# Patient Record
Sex: MALE | Race: WHITE | NOT HISPANIC OR LATINO
[De-identification: names, ages, dates, MRNs, and addresses within clinical notes are randomized per-mention and may not be internally consistent; named-entity substitution may affect disease eponyms.]

---
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
## 2019-01-15 ENCOUNTER — APPOINTMENT (OUTPATIENT)
Dept: SPORTS MEDICINE | Facility: CLINIC | Age: 19
End: 2019-01-15
Payer: COMMERCIAL

## 2019-01-15 DIAGNOSIS — M75.51 BURSITIS OF RIGHT SHOULDER: ICD-10-CM

## 2019-01-15 PROCEDURE — 76881 US COMPL JOINT R-T W/IMG: CPT

## 2019-01-15 PROCEDURE — 99214 OFFICE O/P EST MOD 30 MIN: CPT | Mod: 25

## 2019-01-15 NOTE — HISTORY OF PRESENT ILLNESS
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 He exacerbated his right shoulder pain after playing on September 2018 in tennis matches. Patient reports that pain is when he externally rotates his arm. He also has his right fourth and fifth digits with tingling, but no neck pain and no injury. She had a cervical spine MRI on 6/13/18 when he did have these symptoms as well that was negative for disc herniation or acute pathology, only had straightening of cervical lordosis. MRI of right shoulder on 3/9-18 showed a mild infraspinatus tendinopathy\par     Attending note. This is a new problem for an existing patient. Patient is a 18-year-old college  with chronic right shoulder pain. Patient had MRI in February of 2018 which showed infraspinatus tendinopathy. Patient attempted several months of rest, however pain returned when he resumed playing tennis. Patient is a right-hand dominant player with a two-handed backhand. The patient has pain with serve as well as backhand it probably. Patient does not have pain with his two-handed backhand. Patient has been getting medical massage without improvement. He has not done physical therapy in several months.

## 2019-01-15 NOTE — DISCUSSION/SUMMARY
[de-identified] : Impression:\par 1) right shoulder pain\par 2) infraspinatus tendons split tear\par 3) SASD bursitis\par \par Plan:\par Patient has been having recurrent episodes of right shoulder pain that worsens with external rotation against resistance and with overhead movements, in the setting of an MRI that shows a mild and from spinatus tear from3/9/18, and confirmed with ultrasound findings and physical exam findings. Not improving with physical therapy. We had a discussion regarding regenerative injections including prolotherapy and PRP. Patient and mother would like to schedule appointment for PRP. They were made aware of the risks and benefits and alternatives to the procedure. They were also educated about not using NSAIDs or steroid at least 4-5 days prior to the procedure. \par       Attending note. Impression: #1 chronic right shoulder pain, #2 tendinopathy of the right infraspinatus. Discussed prolotherapy versus PRP. We'll perform PRP under right infraspinatus at next visit. Patient advised not to use NSAIDs before and after her injection. He understands he may require a second PRP injection if only limited response. He should refrain from tennis for 2 weeks and strokes that exacerbate his pain. He will also be provided with a prescription for physical therapy which is a significant component of the treatment for his chronic pain.

## 2019-01-15 NOTE — PHYSICAL EXAM
[FreeTextEntry2] : GEN: no acute distress, AAOx3\par EYES: no conjunctival injection, EOMI\par ENT: no stridor, neck supple with full ROM\par CV: normal capillary refill, normal heart rate, normal peripheral perfusion\par RESP: non-labored breathing, no respiratory distress\par SKIN: warm and dry, no skin breaks\par NEURO: CN grossly intact, no focal sensory or motor deficits\par  \par MSK Shoulder R :\par \par Inspection without erythema, deformity or swelling of the shoulder.\par Palpation without warmth, and without TTP to the C-spine midline, AC joint, deltoid, humerus and elbow.\par Full active and passive ROM of the shoulder including flexion, extension, abduction, adduction, internal and external rotation. However + pain with external rotation with arm abducted at 90 degrees and with adducted arm.\par Normal radial pulses bilaterally. Brisk capillary refill. Soft compartments of the upper extremity.\par Normal motor examination with 5/5 strength and normal sensory examination of median, radial, nerve distribution. Normal sensation of the axillary nerve distribution.\par \par Special tests: \par Neer: negative\par Hawkin: negative\par Eric: + reproduction of pain to the R\par Drop Arm Test: negative\par Speed Test: negative\par Clunk and Crank Test: negative\par Spurling's: negative\par ULTT: positive for Ulnar nerve\par \par Examination of the unaffected shoulder was unremarkable. \par      Attending note. Agree with the above. Patient has tenderness over the infraspinatus. Patient also has pain with resisted external rotation the right shoulder. Skin is normal. Sensation is normal. [de-identified] : MSK US of R Shoulder: The infraspinatus tendon has a hypoechoic defects it is consistent with a split tear. The supraspinatus tendon is thickened and heterogeneous but no obvious tear was noted. There was thickening of the SASD bursa.   Attn - directly supervised and reviewed - agree.

## 2019-01-17 ENCOUNTER — APPOINTMENT (OUTPATIENT)
Dept: ORTHOPEDIC SURGERY | Facility: CLINIC | Age: 19
End: 2019-01-17

## 2019-01-22 ENCOUNTER — APPOINTMENT (OUTPATIENT)
Dept: SPORTS MEDICINE | Facility: CLINIC | Age: 19
End: 2019-01-22
Payer: COMMERCIAL

## 2019-01-22 PROCEDURE — 0232T NJX PLATELET PLASMA: CPT

## 2019-01-22 NOTE — DISCUSSION/SUMMARY
[de-identified] : Attending note. Impression: #1 chronic right shoulder pain, #2 tendinopathy of the right infraspinatus tendon with tear. Patient received PRP today. He will not use anti-inflammatories for the next 72 hours. Patient should not play tennis for the next 2 weeks. Prescription for physical therapy was provided. Return to office in 6 weeks' time for reevaluation.

## 2019-01-22 NOTE — PHYSICAL EXAM
[de-identified] : GEN: no acute distress, AAOx3\par EYES: no conjunctival injection, EOMI\par ENT: no stridor, neck supple with full ROM\par CV: normal capillary refill, normal heart rate, normal peripheral perfusion\par RESP: non-labored breathing, no respiratory distress\par SKIN: warm and dry, no skin breaks\par NEURO: CN grossly intact, no focal sensory or motor deficits\par  \par MSK Shoulder R :\par \par Inspection without erythema, deformity or swelling of the shoulder.\par Palpation without warmth, and without TTP to the C-spine midline, AC joint, deltoid, humerus and elbow.\par Full active and passive ROM of the shoulder including flexion, extension, abduction, adduction, internal and external rotation.\par Normal radial pulses bilaterally. Brisk capillary refill. Soft compartments of the upper extremity.\par Normal motor examination with 5/5 strength and normal sensory examination of median, radial, and ulnar nerve distribution. Normal sensation of the axillary nerve distribution.\par \par Special tests: \par Posterior shoulder pain is reproducible with external rotation against resistance. \par Neer: negative\par Hawkin: negative\par Eric: negative\par Drop Arm Test: negative\par Speed Test: negative\par Clunk and Crank Test: negative\par Spurling's: negative\par \par Examination of the unaffected shoulder was unremarkable. \par \par Attending note. Patient is alert and in acute distress. Patient has some tenderness over the right infraspinatus muscles and tendons. Skin is normal and without signs of cellulitis or erythema.

## 2019-01-22 NOTE — HISTORY OF PRESENT ILLNESS
[de-identified] : Jose Villanueva MD: follow-up visit for R posterior shoulder pain that has flared up after he had tennis practice 5 days ago, after one hour of activity. Pain is back as baseline.  \par \par Attending note. This will visit for this 18-year-old college  with chronic right shoulder pain and tendinopathy of the infraspinatus on MRI. Patient is here for PRP. Patient is not taking NSAIDs in the last 72 hours

## 2019-01-22 NOTE — PROCEDURE
[de-identified] : Attending note. Using ultrasound guidance and aseptic technique, 4 cc of PRP was injected into the musculotendinous junction and tendon of the right supraspinatus. Procedure was tolerated well. Patient experienced some pain.

## 2019-02-05 ENCOUNTER — APPOINTMENT (OUTPATIENT)
Dept: SPORTS MEDICINE | Facility: CLINIC | Age: 19
End: 2019-02-05
Payer: COMMERCIAL

## 2019-02-05 DIAGNOSIS — M25.511 PAIN IN RIGHT SHOULDER: ICD-10-CM

## 2019-02-05 PROCEDURE — 99213 OFFICE O/P EST LOW 20 MIN: CPT

## 2019-02-05 NOTE — PHYSICAL EXAM
[de-identified] : Attending note. Patient is alert and in no acute distress. Examination of the right shoulder reveals full range of motion with some soreness on forward flexion and abduction.

## 2019-02-05 NOTE — DISCUSSION/SUMMARY
[de-identified] : Attending note. Impression: #1 chronic right shoulder pain, #2 tendinopathy of the right infraspinatus. Patient is now 2 weeks status post PRP and reports resolution of previous pain, and only complains of some stiffness and soreness in the right shoulder. Patient was provided with a prescription for physical therapy today. He should refrain from tennis strokes which exacerbate any pain and should not play in simulated games or matches for 2 more weeks. Patient will return to the office on March 5 for reevaluation.

## 2019-03-05 ENCOUNTER — APPOINTMENT (OUTPATIENT)
Dept: SPORTS MEDICINE | Facility: CLINIC | Age: 19
End: 2019-03-05
Payer: COMMERCIAL

## 2019-03-05 DIAGNOSIS — S46.811A STRAIN OF OTHER MUSCLES, FASCIA AND TENDONS AT SHOULDER AND UPPER ARM LEVEL, RIGHT ARM, INITIAL ENCOUNTER: ICD-10-CM

## 2019-03-05 PROCEDURE — 99213 OFFICE O/P EST LOW 20 MIN: CPT

## 2019-03-05 NOTE — DISCUSSION/SUMMARY
[de-identified] : Impression:\par 1) right shoulder pain\par 2) right infraspinatus tendinopathy with split tear: Patient is now 6 weeks after PRP injection, and has had resolution of symptoms with 20 minute practices for the last one week. Patient is still limited in duration and intensity of practices as guided by his . \par \par Plan:\par -Continue with physical therapy and home exercise program\par -At this time patient is able to progress to a longer duration of practices and increase his intensity to 75%\par -Patient should refrain from tennis strokes that exacerbate symptoms but should not play through pain\par -Recommended the patient to refrain from playing in the tournament in [place] in 2 weeks because he is likely deconditioned and has risk of exacerbating his previous injury\par

## 2019-03-05 NOTE — HISTORY OF PRESENT ILLNESS
[de-identified] : Jose Villanueva MD: Follow for 18-year-old college  after PRP to right infraspinatus on January 22. Patient has been participating in limited practices at 20 minutes and not hitting with full velocity or serving overhand. Patient had a flare of pain 2 weeks ago after practice but since then has had no pain during practice or residual pain afterwards. No fevers, chills, numbness, weakness. He has a tournament in [place] in 2 weeks but is aware that he could be limited in participation.

## 2019-03-05 NOTE — PHYSICAL EXAM
[de-identified] : GEN: no acute distress, AAOx3\par EYES: no conjunctival injection, EOMI\par ENT: no stridor, neck supple with full ROM\par CV: normal capillary refill, normal heart rate, normal peripheral perfusion\par RESP: non-labored breathing, no respiratory distress\par SKIN: warm and dry, no skin breaks\par NEURO: CN grossly intact, no focal sensory or motor deficits\par  \par MSK Shoulder R :\par \par Inspection without erythema, deformity or swelling of the shoulder.\par Palpation without warmth, and without TTP to the C-spine midline, AC joint, deltoid, humerus and elbow.\par Full active and passive ROM of the shoulder including flexion, extension, abduction, adduction, internal and external rotation.\par Normal radial pulses bilaterally. Brisk capillary refill. Soft compartments of the upper extremity.\par Normal motor examination with 5/5 strength and normal sensory examination of median, radial, and ulnar nerve distribution. Normal sensation of the axillary nerve distribution.\par \par Special tests: \par Neer: negative\par Hawkin: + mild reproduction of pain\par Eric: negative\par Drop Arm Test: negative\par Speed Test: negative\par Clunk and Crank Test: negative\par Spurling's: negative\par No pain with resisted IR or ER of the shoulder w/ arm adducted. \par + mild dyskinesis of R scapula\par \par Examination of the unaffected shoulder was unremarkable.  [de-identified] : MSK US performed in office.  There was no evidence of significant mid-substance tearing at the infraspinatus/teres minor insertion.

## 2019-06-18 ENCOUNTER — APPOINTMENT (OUTPATIENT)
Dept: SPORTS MEDICINE | Facility: CLINIC | Age: 19
End: 2019-06-18
Payer: COMMERCIAL

## 2019-06-18 PROCEDURE — 72040 X-RAY EXAM NECK SPINE 2-3 VW: CPT

## 2019-06-18 PROCEDURE — 99214 OFFICE O/P EST MOD 30 MIN: CPT | Mod: 25

## 2019-06-18 NOTE — REVIEW OF SYSTEMS
[Joint Pain] : joint pain [Joint Stiffness] : no joint stiffness [Negative] : Endocrine [de-identified] : + tingling

## 2019-06-18 NOTE — DISCUSSION/SUMMARY
[de-identified] : Impression:\par 1. paresthesias in the ulnar distribution: The patient's symptoms may be due to brachial plexopathy as patient had progression of symptoms with traction of the upper trunk of the brachial plexus, and ULTT4 test, however he may also have a component of cervical radiculopathy secondary to a pinched nerve root. Symptoms are likely mostly neuropathic at this time rather than due to shoulder pathology, though this may be concomitant. \par 2. cervical radiculopathy\par 3. brachial plexopathy\par 4. posterior shoulder pain\par \par Plan:\par -Plan to do physical therapy for cervical spine\par -Consider MRI of the C-spine versus brachial plexus if symptoms do not improve\par -Consider corticosteroids orally this patient has persistence of pain\par -Activity modification, NSAIDs as needed\par -Given strict precautions including weakness of the affected extremity and complete numbness, for which he should visit the ED\par -RTC in 3-4 weeks

## 2019-06-18 NOTE — PHYSICAL EXAM
[de-identified] : GEN: no acute distress, AAOx3\par EYES: no conjunctival injection, EOMI\par ENT: no stridor, neck supple with full ROM\par CV: normal capillary refill, normal heart rate, normal peripheral perfusion\par RESP: non-labored breathing, no respiratory distress\par SKIN: warm and dry, no skin breaks\par NEURO: CN grossly intact, no focal sensory or motor deficits\par \par MSK Shoulder R:\par \par Inspection without erythema, deformity or swelling of the shoulder.\par Palpation without warmth, and without TTP to the C-spine midline, AC joint, deltoid, humerus and elbow.\par Full active and passive ROM of the shoulder including flexion, extension, abduction, adduction, internal and external rotation.\par Normal radial pulses bilaterally. Brisk capillary refill. Soft compartments of the upper extremity.\par Normal motor examination with 5/5 strength and normal sensory examination of median, radial, and ulnar nerve distribution. Normal sensation of the axillary nerve distribution.\par \par Special tests: \par Neer: negative\par Hawkin: negative\par Eric: negative\par Drop Arm Test: negative\par Speed Test: negative\par Clunk and Crank Test: negative\par Spurling's: negative\par ULTT4: positive for ulnar distribution paresthesia\par Mild pain w/ resisted internal rotation, but no pain w/ resisted external rotation (previously this reproduced symptoms)\par \par Examination of the unaffected shoulder was unremarkable.

## 2019-06-18 NOTE — HISTORY OF PRESENT ILLNESS
[de-identified] : Jose Villanueva MD: This is a followup visit for this 19-year-old male , who had received therapy injection PRP to the right infraspinatus tendon. Patient had near resolution of symptoms and was able to complete his season. However after his season ended, he played in several tournaments and has since had right-sided neck pain that radiates to his right fourth and fifth digit with tingling and numbness. He denies any acute injury, falls, MVC. He denies any weakness associated with this, or dropping of items. he has had similar sensation in the past and was seen by a spine doctor at that time. He also has right posterior shoulder pain that is mild in nature and difficult to delineate between this and the radiating pain.

## 2019-07-09 ENCOUNTER — APPOINTMENT (OUTPATIENT)
Dept: SPORTS MEDICINE | Facility: CLINIC | Age: 19
End: 2019-07-09
Payer: COMMERCIAL

## 2019-07-09 DIAGNOSIS — M79.601 PAIN IN RIGHT ARM: ICD-10-CM

## 2019-07-09 PROCEDURE — 99213 OFFICE O/P EST LOW 20 MIN: CPT

## 2019-07-09 NOTE — PHYSICAL EXAM
[de-identified] : Attending note. Patient is alert and in no acute distress. There is no cervical tenderness. Patient has full range of motion of his neck. Trapezius is nontender. Patient is external rotation to approximately 90° and internal rotation to approximately 75-80°. There is no shoulder pain with internal or external rotation. Patient experiences pain in the medial right bicep when right shoulder abductor and external rotation and moved to internal rotation. Biceps strength is 5/5 equal and symmetrical. Triceps strength is 5/5 equal and symmetrical. Median, radial and ulnar nerves are intact both motor and sensory function. Distal pulses are intact. Sensation is intact. Skin is normal. He has no pain with resisted internal rotation or external rotation of the right shoulder.

## 2019-07-09 NOTE — DISCUSSION/SUMMARY
[de-identified] : Attending note. Impression: #1 cervical radiculopathy - resolve, #2 right medial arm pain. Pain possibly secondary to GIRD versus adhesions. Patient was given a prescription for physical therapy for GIRD.  He will return to the office before he returns to college on August 26. 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

## 2019-07-09 NOTE — REVIEW OF SYSTEMS
[Arthralgia] : no arthralgia [Joint Stiffness] : joint stiffness [Joint Pain] : no joint pain [Joint Swelling] : no joint swelling [FreeTextEntry9] : medial right upper arm pain

## 2019-07-09 NOTE — HISTORY OF PRESENT ILLNESS
[de-identified] : Attending note. 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 Patient was previously complaining of pain in the right trapezius with paresthesia radiating to the right fourth and fifth digits. Patient was seen by a chiropractor and had manipulation which seems to have resolved pain. Patient is currently complaining of q. daily lancing pain in the medial right bicep which only occurs when right shoulder is abducted and ended movement from external rotation to internal rotation. He denies any muscular weakness. Denies any neck pain.

## 2019-11-14 ENCOUNTER — APPOINTMENT (OUTPATIENT)
Dept: UROLOGY | Facility: CLINIC | Age: 19
End: 2019-11-14
Payer: MEDICAID

## 2019-11-14 PROCEDURE — 99213 OFFICE O/P EST LOW 20 MIN: CPT

## 2019-11-14 RX ORDER — SILDENAFIL 20 MG/1
20 TABLET ORAL
Qty: 30 | Refills: 11 | Status: ACTIVE | COMMUNITY
Start: 2019-11-14 | End: 1900-01-01

## 2019-12-18 NOTE — ED ADULT TRIAGE NOTE - NS ED NOTE AC HIGH RISK COUNTRIES
No Normal rate, regular rhythm.  Heart sounds S1, S2.  No murmurs, rubs or gallops. no chest wall tenderness

## 2020-01-23 ENCOUNTER — APPOINTMENT (OUTPATIENT)
Dept: UROLOGY | Facility: CLINIC | Age: 20
End: 2020-01-23

## 2020-12-08 ENCOUNTER — APPOINTMENT (OUTPATIENT)
Dept: UROLOGY | Facility: CLINIC | Age: 20
End: 2020-12-08
Payer: MEDICAID

## 2020-12-08 ENCOUNTER — TRANSCRIPTION ENCOUNTER (OUTPATIENT)
Age: 20
End: 2020-12-08

## 2020-12-08 ENCOUNTER — OUTPATIENT (OUTPATIENT)
Dept: OUTPATIENT SERVICES | Facility: HOSPITAL | Age: 20
LOS: 1 days | End: 2020-12-08
Payer: MEDICAID

## 2020-12-08 VITALS
HEART RATE: 72 BPM | BODY MASS INDEX: 27.09 KG/M2 | HEIGHT: 72 IN | SYSTOLIC BLOOD PRESSURE: 141 MMHG | DIASTOLIC BLOOD PRESSURE: 80 MMHG | WEIGHT: 200 LBS | TEMPERATURE: 97.8 F | RESPIRATION RATE: 16 BRPM

## 2020-12-08 DIAGNOSIS — R35.0 FREQUENCY OF MICTURITION: ICD-10-CM

## 2020-12-08 PROCEDURE — 99215 OFFICE O/P EST HI 40 MIN: CPT | Mod: 25

## 2020-12-08 PROCEDURE — 93975 VASCULAR STUDY: CPT | Mod: 26

## 2020-12-08 PROCEDURE — 76870 US EXAM SCROTUM: CPT

## 2020-12-08 PROCEDURE — 76870 US EXAM SCROTUM: CPT | Mod: 26

## 2020-12-08 PROCEDURE — 99072 ADDL SUPL MATRL&STAF TM PHE: CPT

## 2020-12-08 PROCEDURE — 93975 VASCULAR STUDY: CPT

## 2020-12-08 RX ORDER — TADALAFIL 10 MG/1
10 TABLET, FILM COATED ORAL
Qty: 12 | Refills: 2 | Status: ACTIVE | COMMUNITY
Start: 2020-12-08 | End: 1900-01-01

## 2020-12-08 RX ORDER — PAPAVERINE HYDROCHLORIDE 30 MG/ML
30 INJECTION, SOLUTION INTRAVENOUS
Qty: 2 | Refills: 0 | Status: ACTIVE | COMMUNITY
Start: 2020-12-08 | End: 1900-01-01

## 2020-12-08 NOTE — ASSESSMENT
[FreeTextEntry1] : This pleasant 20-year-old gentleman presents for evaluation of his sexual dysfunction.  I have requested several blood studies.  Urine dip and microscopic analysis was requested.  I will make further recommendations when the results return. I have suggested a diagnostic injection and duplex ultrasound to evaluate his penile vasculature.  I have also suggested a scrotal ultrasound for his intermittent testicular discomfort.  We discussed his evaluation today and possible options for therapy depending upon the results of his testing.  I will be better able to make more specific recommendations after additional test results return. \par 1. Review blood tests on follow up\par 2. Penile duplex study on follow up\par \par Consultation: 40 minutes:  20 minutes reviewing his history and performing a physical examination.  20 minutes reviewing the ultrasound, writing for prescription medications and blood studies ,discussing treatment options and writing his note. There was also additional time in preparation for today's visit.\par

## 2020-12-08 NOTE — HISTORY OF PRESENT ILLNESS
[FreeTextEntry1] : Patient presents with a chief complaint of erectile dysfunction and right testicular pain which is intermittent. He states that this has been present for the past several years. I reviewed the questionnaire he completed in detail.  He states that his erections presently are often less than 5 out of 10 in both tumescence and rigidity.  He has difficulty maintaining an erection. He describes a normal libido.  He has several sexual partners.  His sexual dysfunction occurs with both sexual relations and masturbation.  His erections are minimally improved with PDE5 inhibitors.  He is a senior in college.\par \par The patient denies fevers, chills, nausea and/or vomiting and no unexplained weight loss.  His past medical history is non-contributory.  In his present occupation he has no known toxin exposure. He does not smoke and drinks socially.  He has no known drug allergies. His review of systems and past medical history demonstrates no significant urologic issues (see patient completed questionnaire). His family history demonstrates no significant urologic issues.

## 2020-12-09 LAB
25(OH)D3 SERPL-MCNC: 28.1 NG/ML
ALBUMIN SERPL ELPH-MCNC: 5 G/DL
ALP BLD-CCNC: 93 U/L
ALT SERPL-CCNC: 15 U/L
ANION GAP SERPL CALC-SCNC: 11 MMOL/L
APPEARANCE: CLEAR
AST SERPL-CCNC: 13 U/L
BASOPHILS # BLD AUTO: 0.05 K/UL
BASOPHILS NFR BLD AUTO: 0.7 %
BILIRUB SERPL-MCNC: 0.8 MG/DL
BILIRUBIN URINE: NEGATIVE
BLOOD URINE: NEGATIVE
BUN SERPL-MCNC: 12 MG/DL
CALCIUM SERPL-MCNC: 10 MG/DL
CHLORIDE SERPL-SCNC: 103 MMOL/L
CHOLEST SERPL-MCNC: 166 MG/DL
CO2 SERPL-SCNC: 28 MMOL/L
COLOR: COLORLESS
CREAT SERPL-MCNC: 1.03 MG/DL
EOSINOPHIL # BLD AUTO: 0.01 K/UL
EOSINOPHIL NFR BLD AUTO: 0.1 %
ESTIMATED AVERAGE GLUCOSE: 100 MG/DL
ESTRADIOL SERPL-MCNC: 37 PG/ML
FSH SERPL-MCNC: 3.3 IU/L
GLUCOSE QUALITATIVE U: NEGATIVE
GLUCOSE SERPL-MCNC: 98 MG/DL
HBA1C MFR BLD HPLC: 5.1 %
HCT VFR BLD CALC: 52.8 %
HDLC SERPL-MCNC: 45 MG/DL
HGB BLD-MCNC: 17.8 G/DL
IMM GRANULOCYTES NFR BLD AUTO: 0.8 %
KETONES URINE: NEGATIVE
LDLC SERPL CALC-MCNC: 107 MG/DL
LEUKOCYTE ESTERASE URINE: NEGATIVE
LH SERPL-ACNC: 6.5 IU/L
LYMPHOCYTES # BLD AUTO: 1.55 K/UL
LYMPHOCYTES NFR BLD AUTO: 20.7 %
MAN DIFF?: NORMAL
MCHC RBC-ENTMCNC: 29.6 PG
MCHC RBC-ENTMCNC: 33.7 GM/DL
MCV RBC AUTO: 87.9 FL
MONOCYTES # BLD AUTO: 0.65 K/UL
MONOCYTES NFR BLD AUTO: 8.7 %
NEUTROPHILS # BLD AUTO: 5.17 K/UL
NEUTROPHILS NFR BLD AUTO: 69 %
NITRITE URINE: NEGATIVE
NONHDLC SERPL-MCNC: 121 MG/DL
PH URINE: 7
PLATELET # BLD AUTO: 209 K/UL
POTASSIUM SERPL-SCNC: 4.7 MMOL/L
PROLACTIN SERPL-MCNC: 12.3 NG/ML
PROT SERPL-MCNC: 7.5 G/DL
PROTEIN URINE: NEGATIVE
RBC # BLD: 6.01 M/UL
RBC # FLD: 11.9 %
SODIUM SERPL-SCNC: 142 MMOL/L
SPECIFIC GRAVITY URINE: 1.01
TRIGL SERPL-MCNC: 73 MG/DL
TSH SERPL-ACNC: 0.93 UIU/ML
UROBILINOGEN URINE: NORMAL
WBC # FLD AUTO: 7.49 K/UL

## 2020-12-12 DIAGNOSIS — N52.9 MALE ERECTILE DYSFUNCTION, UNSPECIFIED: ICD-10-CM

## 2020-12-21 LAB
TESTOST BND SERPL-MCNC: 21.1 PG/ML
TESTOST SERPL-MCNC: 528.3 NG/DL

## 2020-12-23 ENCOUNTER — APPOINTMENT (OUTPATIENT)
Dept: UROLOGY | Facility: CLINIC | Age: 20
End: 2020-12-23
Payer: MEDICAID

## 2020-12-23 PROCEDURE — 99214 OFFICE O/P EST MOD 30 MIN: CPT | Mod: 95

## 2020-12-23 NOTE — ASSESSMENT
[FreeTextEntry1] : The patient returns for a telehealth consultation to review his recent blood studies and to discuss options for therapy.  He has no testicular discomfort it appeared to be a single incident that dissipated quickly.  He states when he tries sildenafil at 20 mg dose, it slightly improves his erections but does not last long.  He does note the next morning his erections might be better.  We discussed trying 2 or 320 mg tablets for a total dose of 40 or 60 mg of sildenafil.  However, he feels it gives him some palpitations and might hold off on that.  I thought that was appropriate.  We have scheduled him for a penile duplex ultrasound and scrotal ultrasound on follow-up in January.  He knows to keep the Trimix cold and bring it with him on the date of the procedure.\par \par Blood studies were essentially normal, .  His hematocrit was 52.8%.  Hemoglobin A1c was 5.1%.  LH was 6.5 IU/L and FSH was 3.3 IU/L.  His testosterone both free and total were normal with a free testosterone of 21.1 pg/mL and a total testosterone of 528.3 ng/dL.  Vitamin D was slightly low we discussed supplementation.\par \par We discussed various etiologies for his erectile dysfunction including psychogenic and vasculogenic.  We will be able to better evaluate after the duplex ultrasound study is performed.  I will see him back in January for these studies.\par \par Telehealth Consultation: 30 minutes  10 minutes reviewing his history and discussing prior results.  20 minutes discussing various treatment options, reviewing his lab testing and writing his note. There was also additional time in preparing for the visit and assisting the patient with technology issues he was having with the telehealth platform.

## 2020-12-23 NOTE — HISTORY OF PRESENT ILLNESS
[Home] : at home, [unfilled] , at the time of the visit. [Medical Office: ([facility])___] : at the medical office located in  [Verbal consent obtained from patient] : the patient, [unfilled] [FreeTextEntry1] : The patient-doctor. relationship has been established in a face-to-face fashion on real-time video audio HIPAA compliant communication using telemedicine software.  The patient's identity has been confirmed.  The patient was previously emailed a copy of the telemedicine consent.  The patient has had a chance to review and has now given verbal consent and has requested care to be assessed and treated through telemedicine. The patient understands there may be limitations in this process and that they need not need further follow-up care in the office and/or hospital settings. We were unable to use the [employer] platform and an alternative platform was utilized.\par \par Verbal consent was given on Wednesday, December 23, 2020 at 10:20 AM by the patient.  It was requested by the physician.  A written consent was previously sent for the patient to sign and return.\par \par The patient returns for a telehealth consultation to review his recent blood studies and to discuss options for therapy.  He has no testicular discomfort it appeared to be a single incident that dissipated quickly.  He states when he tries sildenafil at 20 mg dose, it slightly improves his erections but does not last long.  He does note the next morning his erections might be better.\par \par PMH: Patient presented with a chief complaint of erectile dysfunction and right testicular pain which is intermittent. He states that this has been present for the past several years.  He states that his erections presently are often less than 5 out of 10 in both tumescence and rigidity.  He has difficulty maintaining an erection. He describes a normal libido.  He has several sexual partners.  His sexual dysfunction occurs with both sexual relations and masturbation.  His erections are minimally improved with PDE5 inhibitors.  He is a senior in college.\par \par The patient denies fevers, chills, nausea and/or vomiting and no unexplained weight loss.  His past medical history is non-contributory.  In his present occupation he has no known toxin exposure. He does not smoke and drinks socially.  He has no known drug allergies. His review of systems and past medical history demonstrates no significant urologic issues (see patient completed questionnaire). His family history demonstrates no significant urologic issues.

## 2021-01-21 ENCOUNTER — APPOINTMENT (OUTPATIENT)
Dept: UROLOGY | Facility: CLINIC | Age: 21
End: 2021-01-21
Payer: MEDICAID

## 2021-01-21 ENCOUNTER — OUTPATIENT (OUTPATIENT)
Dept: OUTPATIENT SERVICES | Facility: HOSPITAL | Age: 21
LOS: 1 days | End: 2021-01-21
Payer: MEDICAID

## 2021-01-21 DIAGNOSIS — N52.9 MALE ERECTILE DYSFUNCTION, UNSPECIFIED: ICD-10-CM

## 2021-01-21 PROCEDURE — 93980 PENILE VASCULAR STUDY: CPT

## 2021-01-21 PROCEDURE — 99214 OFFICE O/P EST MOD 30 MIN: CPT | Mod: 25

## 2021-01-21 PROCEDURE — 93980 PENILE VASCULAR STUDY: CPT | Mod: 26

## 2021-01-21 PROCEDURE — 99072 ADDL SUPL MATRL&STAF TM PHE: CPT

## 2021-01-21 NOTE — HISTORY OF PRESENT ILLNESS
[FreeTextEntry1] : The patient returns for a follow up consultation.He states his erections are not as good as they had been. He has not been recently sexually active. However, states that his erections with sedation or less. They appeared to work better with the use of sildenafil.\par \par PMH: Patient presented with a chief complaint of erectile dysfunction and right testicular pain which is intermittent. He states that this has been present for the past several years.  He states that his erections presently are often less than 5 out of 10 in both tumescence and rigidity.  He has difficulty maintaining an erection. He describes a normal libido.  He has several sexual partners.  His sexual dysfunction occurs with both sexual relations and masturbation.  His erections are minimally improved with PDE5 inhibitors.  He is a senior in college.\par \par The patient denies fevers, chills, nausea and/or vomiting and no unexplained weight loss.  His past medical history is non-contributory.  In his present occupation he has no known toxin exposure. He does not smoke and drinks socially.  He has no known drug allergies. His review of systems and past medical history demonstrates no significant urologic issues (see patient completed questionnaire). His family history demonstrates no significant urologic issues.

## 2021-01-21 NOTE — ASSESSMENT
[FreeTextEntry1] : He states his erections are not as good as they had been. He has not been recently sexually active. However, states that his erections with sedation or less. They appeared to work better with the use of sildenafil  at 20 mg dose, \par \par I performed a penile duplex ultrasound which demonstrated good arteries at a dose of 0.01 cc of the Trimix. He had a proximately 70% tumescence and percent rigidity. I did not go up and dose because I felt it would become prior to his back and require both aspiration and irrigation. I feel there is a large supratentorial component and have recommended so then a field 20-40 mg as needed and to discuss further her in 3 months by Telehealth.\par \par Blood studies were essentially normal, .  His hematocrit was 52.8%.  Hemoglobin A1c was 5.1%.  LH was 6.5 IU/L and FSH was 3.3 IU/L.  His testosterone both free and total were normal with a free testosterone of 21.1 pg/mL and a total testosterone of 528.3 ng/dL.  Vitamin D was slightly low we discussed supplementation.\par \par We discussed various etiologies for his erectile dysfunction including psychogenic and vasculogenic.  We will be able to better evaluate after the duplex ultrasound study is performed.  I will see him back in January for these studies.\par \par Consultation: 30 minutes  10 minutes reviewing his history and discussing prior results.  20 minutes reviewing his ultrasound, discussing various treatment options,and writing his note. There was also additional time in preparing for the visit.

## 2021-01-21 NOTE — PHYSICAL EXAM
[General Appearance - Well Developed] : well developed [General Appearance - Well Nourished] : well nourished [Normal Appearance] : normal appearance [Well Groomed] : well groomed [General Appearance - In No Acute Distress] : no acute distress [Abdomen Soft] : soft [Urinary Bladder Findings] : the bladder was normal on palpation [Urethral Meatus] : meatus normal [Scrotum] : the scrotum was normal [Testes Mass (___cm)] : there were no testicular masses [Skin Turgor] : supple [Oriented To Time, Place, And Person] : oriented to person, place, and time [Affect] : the affect was normal [Mood] : the mood was normal [Not Anxious] : not anxious [Normal Station and Gait] : the gait and station were normal for the patient's age

## 2021-01-25 DIAGNOSIS — N52.9 MALE ERECTILE DYSFUNCTION, UNSPECIFIED: ICD-10-CM

## 2021-02-22 ENCOUNTER — TRANSCRIPTION ENCOUNTER (OUTPATIENT)
Age: 21
End: 2021-02-22

## 2021-06-01 ENCOUNTER — APPOINTMENT (OUTPATIENT)
Dept: SPORTS MEDICINE | Facility: CLINIC | Age: 21
End: 2021-06-01
Payer: MEDICAID

## 2021-06-01 DIAGNOSIS — S66.911A STRAIN OF UNSPECIFIED MUSCLE, FASCIA AND TENDON AT WRIST AND HAND LEVEL, RIGHT HAND, INITIAL ENCOUNTER: ICD-10-CM

## 2021-06-01 PROCEDURE — 29260 STRAPPING OF ELBOW OR WRIST: CPT

## 2021-06-01 PROCEDURE — 99072 ADDL SUPL MATRL&STAF TM PHE: CPT

## 2021-06-01 PROCEDURE — 73110 X-RAY EXAM OF WRIST: CPT

## 2021-06-01 PROCEDURE — 99214 OFFICE O/P EST MOD 30 MIN: CPT | Mod: 25

## 2021-06-01 NOTE — REVIEW OF SYSTEMS
[Joint Pain] : joint pain [Negative] : Constitutional [Arthralgia] : no arthralgia [Joint Swelling] : no joint swelling [Chills] : no chills

## 2021-06-01 NOTE — HISTORY OF PRESENT ILLNESS
[de-identified] : Follow up visit for a 21 y M well known to the office.  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  RHD.\par Complains of a subacute right wrist pain on the ulnar aspect that developed in February 2021 and has been waxing and waning in terms of severity since then. States that his pain became severe approximately 3 weeks ago, worse with activity (serving especially) improved with rest. He stopped playing altogether approximately 2 weeks ago and states that in the last 3-4 days his pain has improved. He is using a wrist strap to help with immobilization, he states this has been helping. No mechanical symptoms or swelling. No radiation of pain. He is not taking medications for this pain\par

## 2021-06-01 NOTE — DISCUSSION/SUMMARY
[de-identified] : R wrist strain, likely ECU v (less likely) TFCC.  No concern for ulnar neuropathy.  \par --R wrist cockup splint applied.  Pt to wear PRN, especially while at work.\par --NSAIDs PRN\par --Home exercise programs to start, described in detail\par --Will see him back in 2 wk and plan to start PT if pain persists.  \par --Defer injections at this time though patient may benefit from prolo/PRP.  Defer MRI at this time.

## 2021-06-01 NOTE — PHYSICAL EXAM
[de-identified] : Physical Exam \par General Appearance: Well-nourished, well developed in no acute distress\par Orientation: Oriented to person, place and time. Mood / Affect: Calm\par Gait: normal Coordination: normal\par \par Wrist / Hand Exam \par Inspection/Palpation: nontender, no effusion bilaterally\par TTP on ulnar aspect of R wrist/hand, just distal to TFCC.  No pain at TFCC fovea.  No pain/mechanical symptoms with TFCC grind\par Wrist Flexion: 55 / 55 Wrist Extension_(R/L): 45 / 45\par Radial Deviation:12 / 12 Ulnar Deviation (R/L): 30 / 30\par Resisted ulnar deviation reproduces patient's symptoms\par Hand Function: Able to A-OK, Hook horns, cross fingers, thumbs up bilaterally\par Sensation: Subjective normal median, ulnar, radial and axillary sensation bilaterally\par Vasculature: 2+ radial pulse bilaterally\par Wrist Stability: no instability bilaterally\par Wrist Flexion: 5/5 / 5/5 Wrist Extension (R/L): 5/5 / 5/5 \par Intrinsics: 5/5 / 5/5\par UE Skin: no rashes or lesions bilaterally\par Lymph UE: no axillary lymphadenopathy\par DTR UE: Biceps (2+/2+), Triceps (2+/2+)\par Tinel's: negative at the carpal tunnel bilaterally [de-identified] : Four view XR of the R wrist performed in the office today.  My interpretation shows no fractures.  Good alignment of carpal rows.  No subluxations.  Normal soft tissue.

## 2021-06-15 ENCOUNTER — APPOINTMENT (OUTPATIENT)
Dept: SPORTS MEDICINE | Facility: CLINIC | Age: 21
End: 2021-06-15

## 2021-07-11 ENCOUNTER — TRANSCRIPTION ENCOUNTER (OUTPATIENT)
Age: 21
End: 2021-07-11

## 2021-11-28 ENCOUNTER — TRANSCRIPTION ENCOUNTER (OUTPATIENT)
Age: 21
End: 2021-11-28

## 2022-03-07 ENCOUNTER — TRANSCRIPTION ENCOUNTER (OUTPATIENT)
Age: 22
End: 2022-03-07

## 2022-11-28 ENCOUNTER — NON-APPOINTMENT (OUTPATIENT)
Age: 22
End: 2022-11-28

## 2022-12-04 ENCOUNTER — NON-APPOINTMENT (OUTPATIENT)
Age: 22
End: 2022-12-04

## 2023-08-28 ENCOUNTER — APPOINTMENT (OUTPATIENT)
Dept: ORTHOPEDIC SURGERY | Facility: CLINIC | Age: 23
End: 2023-08-28
Payer: MEDICAID

## 2023-08-28 DIAGNOSIS — S43.431A SUPERIOR GLENOID LABRUM LESION OF RIGHT SHOULDER, INITIAL ENCOUNTER: ICD-10-CM

## 2023-08-28 DIAGNOSIS — M75.21 BICIPITAL TENDINITIS, RIGHT SHOULDER: ICD-10-CM

## 2023-08-28 PROCEDURE — 99204 OFFICE O/P NEW MOD 45 MIN: CPT

## 2023-08-28 PROCEDURE — 73030 X-RAY EXAM OF SHOULDER: CPT | Mod: RT

## 2023-08-28 PROCEDURE — 73010 X-RAY EXAM OF SHOULDER BLADE: CPT | Mod: RT

## 2023-08-28 NOTE — DISCUSSION/SUMMARY
[de-identified] : Obtain MRI of right shoulder. ro labral tear, biceps pathology Start Physical therapy  ----------------------------------------------------------------------------  The patient was advised that an advanced diagnostic/imaging study (MRI/CT/etc) will be ordered to evaluate potential pathology in the affected area(s).  The patient was further advised to follow up in the office to review the results of the study and determine further management that may be indicated.  ----------------------------------------------------------------------------  All relevant imaging studies pertinent to today's visit, including x-rays, MRI's and/or other advanced imaging studies (CT/etc) were independently interpreted and reviewed with the patient as needed. Implications of the studies together with the patient's clinical picture were discussed to formulate a working diagnosis and management options were detailed.  The patient was advised of the diagnosis.  The natural history of the pathology was explained in full. All questions were answered.  The risks and benefits of conservative and interventional treatment alternatives were explained to the patient

## 2023-08-28 NOTE — IMAGING
[Right] : right shoulder [There are no fractures, subluxations or dislocations. No significant abnormalities are seen] : There are no fractures, subluxations or dislocations. No significant abnormalities are seen [de-identified] :   ----------------------------------------------------------------------------   Right shoulder exam:   Skin: no significant pertinent finding Inspection: no obvious deformity, no obvious masses, no swelling, no effusion, no atrophy ROM:    FF: 180    ER: 70    IR: T12 Tenderness:    (+) Anterior/Biceps:    (neg) Posterior    (neg) Lateral    (neg) Trapezius    (neg) Scapula    (neg) AC joint    (neg) Crepitus with ROM Stability:    (neg) Translation    (neg) Apprehension    (neg) Clicking Additional tests:    (+) Neer's    (neg)Hawkin's    (+) Grcaia's    (neg) Speed    (neg) Cross chest adduction Strength:    FF: 5/5    ER: 5/5    IR: 5/5    Biceps: 5/5    Triceps: 5/5    Distal: 5/5 Neuro: In tact to light touch throughout Vascularity: Extremity warm and well perfused

## 2023-08-28 NOTE — HISTORY OF PRESENT ILLNESS
[Dull/Aching] : dull/aching [Intermittent] : intermittent [Leisure] : leisure [Rest] : rest [de-identified] : This is Mr. JOSEFINA GUTIERREZ  a 23 year old male who comes in today complaining of right shoulder pain for about 2 months. Right Ant/Sup pain. Injury was lifting weights, pushing movement. Plays tennis as well which is painful  2019 hx of PRP for shoulder. [] : no [FreeTextEntry1] : right shoulder  [FreeTextEntry5] : JOSEFINA rosas [RHD] 23 year old male is here today c/o right shoulder pain. onset ~2 months ago after playing tennis and weightlifting. c/o pain in shoulder with lifting arm. denies N/T/prior sx  -hx of R shoulder PRP injection 2019 [de-identified] : lifting arm [de-identified] : 2019 [de-identified] : outside provider

## 2023-08-31 ENCOUNTER — NON-APPOINTMENT (OUTPATIENT)
Age: 23
End: 2023-08-31

## 2023-09-06 ENCOUNTER — APPOINTMENT (OUTPATIENT)
Dept: MRI IMAGING | Facility: CLINIC | Age: 23
End: 2023-09-06

## 2023-09-13 ENCOUNTER — APPOINTMENT (OUTPATIENT)
Dept: ORTHOPEDIC SURGERY | Facility: CLINIC | Age: 23
End: 2023-09-13

## 2023-10-14 ENCOUNTER — NON-APPOINTMENT (OUTPATIENT)
Age: 23
End: 2023-10-14

## 2024-01-07 ENCOUNTER — NON-APPOINTMENT (OUTPATIENT)
Age: 24
End: 2024-01-07

## 2024-03-15 ENCOUNTER — EMERGENCY (EMERGENCY)
Facility: HOSPITAL | Age: 24
LOS: 0 days | Discharge: HOME | End: 2024-03-15
Attending: STUDENT IN AN ORGANIZED HEALTH CARE EDUCATION/TRAINING PROGRAM
Payer: MEDICAID

## 2024-03-15 VITALS
WEIGHT: 214.95 LBS | SYSTOLIC BLOOD PRESSURE: 124 MMHG | HEART RATE: 85 BPM | DIASTOLIC BLOOD PRESSURE: 71 MMHG | RESPIRATION RATE: 19 BRPM | TEMPERATURE: 98 F | OXYGEN SATURATION: 98 %

## 2024-03-15 VITALS
OXYGEN SATURATION: 99 % | DIASTOLIC BLOOD PRESSURE: 68 MMHG | SYSTOLIC BLOOD PRESSURE: 131 MMHG | HEART RATE: 79 BPM | RESPIRATION RATE: 18 BRPM

## 2024-03-15 DIAGNOSIS — M54.2 CERVICALGIA: ICD-10-CM
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
## 2024-03-15 DIAGNOSIS — Z91.010 ALLERGY TO PEANUTS: ICD-10-CM

## 2024-03-15 DIAGNOSIS — R68.84 JAW PAIN: ICD-10-CM

## 2024-03-15 DIAGNOSIS — Z91.040 LATEX ALLERGY STATUS: ICD-10-CM

## 2024-03-15 DIAGNOSIS — M54.12 RADICULOPATHY, CERVICAL REGION: ICD-10-CM

## 2024-03-15 PROCEDURE — 99284 EMERGENCY DEPT VISIT MOD MDM: CPT

## 2024-03-15 PROCEDURE — 70486 CT MAXILLOFACIAL W/O DYE: CPT | Mod: 26,MC

## 2024-03-15 PROCEDURE — 70450 CT HEAD/BRAIN W/O DYE: CPT | Mod: 26,MC

## 2024-03-15 RX ORDER — CYCLOBENZAPRINE HYDROCHLORIDE 10 MG/1
1 TABLET, FILM COATED ORAL
Qty: 12 | Refills: 0
Start: 2024-03-15 | End: 2024-03-18

## 2024-03-15 RX ORDER — ACETAMINOPHEN 500 MG
975 TABLET ORAL ONCE
Refills: 0 | Status: COMPLETED | OUTPATIENT
Start: 2024-03-15 | End: 2024-03-15

## 2024-03-15 RX ORDER — CYCLOBENZAPRINE HYDROCHLORIDE 10 MG/1
10 TABLET, FILM COATED ORAL ONCE
Refills: 0 | Status: COMPLETED | OUTPATIENT
Start: 2024-03-15 | End: 2024-03-15

## 2024-03-15 RX ADMIN — Medication 975 MILLIGRAM(S): at 16:52

## 2024-03-15 RX ADMIN — CYCLOBENZAPRINE HYDROCHLORIDE 10 MILLIGRAM(S): 10 TABLET, FILM COATED ORAL at 16:52

## 2024-03-15 NOTE — ED PROVIDER NOTE - PATIENT PORTAL LINK FT
You can access the FollowMyHealth Patient Portal offered by [facility] by registering at the following website: [email or web address]. By joining [employer]’s FollowMyHealth portal, you will also be able to view your health information using other applications (apps) compatible with our system.

## 2024-03-15 NOTE — ED ADULT NURSE NOTE - NSFALLUNIVINTERV_ED_ALL_ED
Bed/Stretcher in lowest position, wheels locked, appropriate side rails in place/Call bell, personal items and telephone in reach/Instruct patient to call for assistance before getting out of bed/chair/stretcher/Non-slip footwear applied when patient is off stretcher/[place] to call system/Physically safe environment - no spills, clutter or unnecessary equipment/Purposeful proactive rounding/Room/bathroom lighting operational, light cord in reach

## 2024-03-15 NOTE — ED PROVIDER NOTE - PHYSICAL EXAMINATION
GENERAL: no acute distress; well-developed  HEAD:  Atraumatic, Normocephalic  EYES: EOMI, PERRLA, conjunctiva and sclera clear  ENT: MMM; oropharynx clear +ttp of b/l mandibular. able to open and close jaw. No major ecchymosis.   NECK: Supple, No JVD  CHEST/LUNG: Clear to auscultation bilaterally; No wheeze  HEART: Regular rate and rhythm; No murmurs, rubs, or gallops  ABDOMEN: Soft, Nontender, Nondistended; Bowel sounds present  EXTREMITIES:  2+ Peripheral Pulses, No clubbing, cyanosis, or edema  PSYCH: AAOx3  NEUROLOGY: no focal motor or sensory deficits. 5/5 muscle strength in all extremities.   SKIN: No rashes or lesions
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
## 2024-03-15 NOTE — ED PROVIDER NOTE - CARE PROVIDER_API CALL
Osbaldo Olsen  Orthopaedic Surgery  [address] [facility], [address] [address]  Phone: [phone]  Fax: [phone]  Follow Up Time:
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
## 2024-03-15 NOTE — ED ADULT NURSE NOTE - SUICIDE SCREENING DEPRESSION
*PCF OU; BECOMING VISUALLY SIGNIFICANT BUT NOT BOTHERSOME TO PATIENT AT THIS TIME. CONTINUE TO FOLLOW FOR NOW. OFFER SPEC RX UPDATE. Negative

## 2024-03-15 NOTE — ED PROVIDER NOTE - PROGRESS NOTE DETAILS
Pain improved-no acute finding on CT Pain improved-no acute finding on CT. On reassessment, he is now reporting some L sided radicular shooting pain with flexion of neck--will prescribe Medrol dose pack also and provide spine follow up.

## 2024-03-15 NOTE — ED PROVIDER NOTE - NSFOLLOWUPINSTRUCTIONS_ED_ALL_ED_FT
Take Tylenol 650 mg every 6-8 hours or Motrin 400-600 mg every 6-8 hours as need for pain  Flexiril every 8 hours as needed for neck pain. Do not drive after taking this medication as it may cause drowsiness  Seek Medical attention or return to the emergency department for any new or worsening symptoms. Take Tylenol 650 mg every 6-8 hours or Motrin 400-600 mg every 6-8 hours as need for pain  Flexiril every 8 hours as needed for neck pain. Do not drive after taking this medication as it may cause drowsiness  Medrol Dose pack as prescribed   Seek Medical attention or return to the emergency department for any new or worsening symptoms.

## 2024-05-30 ENCOUNTER — EMERGENCY (EMERGENCY)
Facility: HOSPITAL | Age: 24
LOS: 0 days | Discharge: HOME | End: 2024-05-31
Attending: STUDENT IN AN ORGANIZED HEALTH CARE EDUCATION/TRAINING PROGRAM
Payer: MEDICAID

## 2024-05-30 VITALS
HEART RATE: 82 BPM | SYSTOLIC BLOOD PRESSURE: 138 MMHG | HEIGHT: 72 IN | DIASTOLIC BLOOD PRESSURE: 77 MMHG | RESPIRATION RATE: 18 BRPM | WEIGHT: 214.95 LBS | TEMPERATURE: 98 F | OXYGEN SATURATION: 97 %

## 2024-05-30 DIAGNOSIS — Z91.010 ALLERGY TO PEANUTS: ICD-10-CM

## 2024-05-30 DIAGNOSIS — R07.9 CHEST PAIN, UNSPECIFIED: ICD-10-CM
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
## 2024-05-30 DIAGNOSIS — T65.91XA TOXIC EFFECT OF UNSPECIFIED SUBSTANCE, ACCIDENTAL (UNINTENTIONAL), INITIAL ENCOUNTER: ICD-10-CM
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
## 2024-05-30 DIAGNOSIS — Z91.040 LATEX ALLERGY STATUS: ICD-10-CM

## 2024-05-30 PROCEDURE — 93010 ELECTROCARDIOGRAM REPORT: CPT

## 2024-05-30 PROCEDURE — 99285 EMERGENCY DEPT VISIT HI MDM: CPT

## 2024-05-30 NOTE — ED ADULT TRIAGE NOTE - NS ED NURSE BANDS TYPE
----- Message from Anatoly Cartagena sent at 6/7/2021 11:53 AM EDT -----  Regarding: Dr Eliazar Gutierrez: [phone]  Medication Refill    Caller (if not patient):      Relationship of caller (if not patient):Josseline/mother      Best contact number(s):[phone]      Name of medication and dosage if known: Adderall       Is patient out of this medication (yes/no):yes      Pharmacy name:[facility] pharmacy on [address] listed in chart? (yes/no):yes  Pharmacy phone number:      Details to clarify the request:      Anatoly Cartagena Name band;

## 2024-05-31 VITALS
DIASTOLIC BLOOD PRESSURE: 70 MMHG | OXYGEN SATURATION: 98 % | TEMPERATURE: 98 F | RESPIRATION RATE: 18 BRPM | SYSTOLIC BLOOD PRESSURE: 123 MMHG | HEART RATE: 69 BPM

## 2024-05-31 PROBLEM — Z78.9 OTHER SPECIFIED HEALTH STATUS: Chronic | Status: ACTIVE | Noted: 2024-03-15

## 2024-05-31 LAB
ALBUMIN SERPL ELPH-MCNC: 3.9 G/DL — SIGNIFICANT CHANGE UP (ref 3.3–5)
ALP SERPL-CCNC: 72 U/L — SIGNIFICANT CHANGE UP (ref 40–120)
ALT FLD-CCNC: 40 U/L — SIGNIFICANT CHANGE UP (ref 12–78)
ANION GAP SERPL CALC-SCNC: 9 MMOL/L — SIGNIFICANT CHANGE UP (ref 5–17)
AST SERPL-CCNC: 32 U/L — SIGNIFICANT CHANGE UP (ref 15–37)
BASOPHILS # BLD AUTO: 0.06 K/UL — SIGNIFICANT CHANGE UP (ref 0–0.2)
BASOPHILS NFR BLD AUTO: 0.6 % — SIGNIFICANT CHANGE UP (ref 0–2)
BILIRUB SERPL-MCNC: 0.5 MG/DL — SIGNIFICANT CHANGE UP (ref 0.2–1.2)
BUN SERPL-MCNC: 8 MG/DL — SIGNIFICANT CHANGE UP (ref 7–23)
CALCIUM SERPL-MCNC: 8.7 MG/DL — SIGNIFICANT CHANGE UP (ref 8.5–10.1)
CHLORIDE SERPL-SCNC: 104 MMOL/L — SIGNIFICANT CHANGE UP (ref 96–108)
CO2 SERPL-SCNC: 29 MMOL/L — SIGNIFICANT CHANGE UP (ref 22–31)
CREAT SERPL-MCNC: 0.99 MG/DL — SIGNIFICANT CHANGE UP (ref 0.5–1.3)
EGFR: 108 ML/MIN/1.73M2 — SIGNIFICANT CHANGE UP
EOSINOPHIL # BLD AUTO: 0.19 K/UL — SIGNIFICANT CHANGE UP (ref 0–0.5)
EOSINOPHIL NFR BLD AUTO: 2 % — SIGNIFICANT CHANGE UP (ref 0–6)
GLUCOSE SERPL-MCNC: 108 MG/DL — HIGH (ref 70–99)
HCT VFR BLD CALC: 44.2 % — SIGNIFICANT CHANGE UP (ref 39–50)
HGB BLD-MCNC: 15.6 G/DL — SIGNIFICANT CHANGE UP (ref 13–17)
IMM GRANULOCYTES NFR BLD AUTO: 0.4 % — SIGNIFICANT CHANGE UP (ref 0–0.9)
LYMPHOCYTES # BLD AUTO: 1.95 K/UL — SIGNIFICANT CHANGE UP (ref 1–3.3)
LYMPHOCYTES # BLD AUTO: 21 % — SIGNIFICANT CHANGE UP (ref 13–44)
MCHC RBC-ENTMCNC: 30.2 PG — SIGNIFICANT CHANGE UP (ref 27–34)
MCHC RBC-ENTMCNC: 35.3 G/DL — SIGNIFICANT CHANGE UP (ref 32–36)
MCV RBC AUTO: 85.5 FL — SIGNIFICANT CHANGE UP (ref 80–100)
MONOCYTES # BLD AUTO: 1.16 K/UL — HIGH (ref 0–0.9)
MONOCYTES NFR BLD AUTO: 12.5 % — SIGNIFICANT CHANGE UP (ref 2–14)
NEUTROPHILS # BLD AUTO: 5.9 K/UL — SIGNIFICANT CHANGE UP (ref 1.8–7.4)
NEUTROPHILS NFR BLD AUTO: 63.5 % — SIGNIFICANT CHANGE UP (ref 43–77)
NRBC # BLD: 0 /100 WBCS — SIGNIFICANT CHANGE UP (ref 0–0)
PLATELET # BLD AUTO: 190 K/UL — SIGNIFICANT CHANGE UP (ref 150–400)
POTASSIUM SERPL-MCNC: 4 MMOL/L — SIGNIFICANT CHANGE UP (ref 3.5–5.3)
POTASSIUM SERPL-SCNC: 4 MMOL/L — SIGNIFICANT CHANGE UP (ref 3.5–5.3)
PROT SERPL-MCNC: 6.9 GM/DL — SIGNIFICANT CHANGE UP (ref 6–8.3)
RBC # BLD: 5.17 M/UL — SIGNIFICANT CHANGE UP (ref 4.2–5.8)
RBC # FLD: 12.8 % — SIGNIFICANT CHANGE UP (ref 10.3–14.5)
SODIUM SERPL-SCNC: 142 MMOL/L — SIGNIFICANT CHANGE UP (ref 135–145)
TROPONIN I, HIGH SENSITIVITY RESULT: 3.7 NG/L — SIGNIFICANT CHANGE UP
WBC # BLD: 9.3 K/UL — SIGNIFICANT CHANGE UP (ref 3.8–10.5)
WBC # FLD AUTO: 9.3 K/UL — SIGNIFICANT CHANGE UP (ref 3.8–10.5)

## 2024-05-31 PROCEDURE — 71045 X-RAY EXAM CHEST 1 VIEW: CPT | Mod: 26
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
## 2024-05-31 NOTE — ED PROVIDER NOTE - CLINICAL SUMMARY MEDICAL DECISION MAKING FREE TEXT BOX
young male presenting to the ED for chest pressure sensation x 2 days    concern for unexpected drug side effect, r/o ACS, PTX  labs wnl  CXR clear   dispo home

## 2024-05-31 NOTE — ED PROVIDER NOTE - PHYSICAL EXAMINATION
General: Well appearing male in no acute distress  HEENT: Normocephalic, atraumatic. Moist mucous membranes. Oropharynx clear. No lymphadenopathy.  Eyes: No scleral icterus. EOMI. EVE.  Neck:. Soft and supple. Full ROM without pain. No midline tenderness  Cardiac: Regular rate and regular rhythm. No murmurs, rubs, gallops. Peripheral pulses 2+ and symmetric. No LE edema.  Resp: Lungs CTAB. Speaking in full sentences. No wheezes, rales or rhonchi.  Abd: Soft, non-tender, non-distended. No guarding or rebound. No scars, masses, or lesions.  Back: Spine midline and non-tender. No CVA tenderness.    Skin: No rashes, abrasions, or lacerations.  Neuro: AO x 3. Moves all extremities symmetrically. Motor strength and sensation grossly intact.
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
## 2024-05-31 NOTE — ED PROVIDER NOTE - PATIENT PORTAL LINK FT
You can access the FollowMyHealth Patient Portal offered by [facility] by registering at the following website: [email or web address]. By joining [employer]’s FollowMyHealth portal, you will also be able to view your health information using other applications (apps) compatible with our system.

## 2024-05-31 NOTE — ED ADULT NURSE NOTE - SUICIDE SCREENING DEPRESSION
You can access the FollowMyHealth Patient Portal offered by [facility] by registering at the following website: [email or web address]. By joining [employer]’s FollowMyHealth portal, you will also be able to view your health information using other applications (apps) compatible with our system.
Negative

## 2024-09-07 ENCOUNTER — NON-APPOINTMENT (OUTPATIENT)
Age: 24
End: 2024-09-07